# Patient Record
Sex: FEMALE | Race: WHITE | NOT HISPANIC OR LATINO | URBAN - METROPOLITAN AREA
[De-identification: names, ages, dates, MRNs, and addresses within clinical notes are randomized per-mention and may not be internally consistent; named-entity substitution may affect disease eponyms.]

---

## 2019-01-02 ENCOUNTER — APPOINTMENT (OUTPATIENT)
Dept: URBAN - METROPOLITAN AREA CLINIC 219 | Age: 29
Setting detail: DERMATOLOGY
End: 2019-01-02

## 2019-01-02 DIAGNOSIS — L01.01 NON-BULLOUS IMPETIGO: ICD-10-CM

## 2019-01-02 PROBLEM — L08.9 LOCAL INFECTION OF THE SKIN AND SUBCUTANEOUS TISSUE, UNSPECIFIED: Status: ACTIVE | Noted: 2019-01-02

## 2019-01-02 PROCEDURE — OTHER PRESCRIPTION: OTHER

## 2019-01-02 PROCEDURE — OTHER TREATMENT REGIMEN: OTHER

## 2019-01-02 PROCEDURE — 99202 OFFICE O/P NEW SF 15 MIN: CPT

## 2019-01-02 RX ORDER — DOXYCYCLINE 100 MG/1
1 CAPSULE ORAL
Qty: 28 | Refills: 0 | Status: ERX | COMMUNITY
Start: 2019-01-02

## 2019-01-02 RX ORDER — MUPIROCIN 20 MG/G
APPLY OINTMENT TOPICAL AS NEEDED
Qty: 1 | Refills: 3 | Status: ERX | COMMUNITY
Start: 2019-01-02

## 2019-01-02 ASSESSMENT — LOCATION ZONE DERM
LOCATION ZONE: FACE
LOCATION ZONE: ARM

## 2019-01-02 ASSESSMENT — LOCATION DETAILED DESCRIPTION DERM
LOCATION DETAILED: RIGHT VENTRAL LATERAL PROXIMAL FOREARM
LOCATION DETAILED: LEFT CHIN

## 2019-01-02 ASSESSMENT — LOCATION SIMPLE DESCRIPTION DERM
LOCATION SIMPLE: RIGHT FOREARM
LOCATION SIMPLE: CHIN

## 2019-01-02 NOTE — PROCEDURE: TREATMENT REGIMEN
Samples Given: Dove Wash for Sensitive skin ( Bar )
Plan: Bacterial Culture performed on chin and right arm ( sent to [facility] )\\ndoxycycline monohydrate 100 mg: Take 1 pill twice a day with food and water x14 days\\nmupirocin 2 % topical ointment: Apply to wounds twice a day as needed
Detail Level: Simple

## 2019-01-16 ENCOUNTER — APPOINTMENT (OUTPATIENT)
Dept: URBAN - METROPOLITAN AREA CLINIC 219 | Age: 29
Setting detail: DERMATOLOGY
End: 2019-01-16

## 2019-01-16 DIAGNOSIS — L01.01 NON-BULLOUS IMPETIGO: ICD-10-CM

## 2019-01-16 DIAGNOSIS — L90.5 SCAR CONDITIONS AND FIBROSIS OF SKIN: ICD-10-CM

## 2019-01-16 PROBLEM — F32.9 MAJOR DEPRESSIVE DISORDER, SINGLE EPISODE, UNSPECIFIED: Status: ACTIVE | Noted: 2019-01-16

## 2019-01-16 PROBLEM — F41.9 ANXIETY DISORDER, UNSPECIFIED: Status: ACTIVE | Noted: 2019-01-16

## 2019-01-16 PROBLEM — L70.0 ACNE VULGARIS: Status: ACTIVE | Noted: 2019-01-16

## 2019-01-16 PROCEDURE — OTHER MIPS QUALITY: OTHER

## 2019-01-16 PROCEDURE — OTHER TREATMENT REGIMEN: OTHER

## 2019-01-16 PROCEDURE — 99213 OFFICE O/P EST LOW 20 MIN: CPT

## 2019-01-16 ASSESSMENT — LOCATION ZONE DERM
LOCATION ZONE: FACE
LOCATION ZONE: ARM
LOCATION ZONE: LEG

## 2019-01-16 ASSESSMENT — LOCATION DETAILED DESCRIPTION DERM
LOCATION DETAILED: LEFT CHIN
LOCATION DETAILED: RIGHT VENTRAL LATERAL PROXIMAL FOREARM
LOCATION DETAILED: LEFT MEDIAL DISTAL PRETIBIAL REGION

## 2019-01-16 ASSESSMENT — LOCATION SIMPLE DESCRIPTION DERM
LOCATION SIMPLE: LEFT PRETIBIAL REGION
LOCATION SIMPLE: CHIN
LOCATION SIMPLE: RIGHT FOREARM

## 2019-01-16 NOTE — PROCEDURE: TREATMENT REGIMEN
Detail Level: Simple
Otc Regimen: Silicone Gel as needed
Continue Regimen: Complete Bactrim course (patient has two days left)\\nMupirocin Ointment twice a day prn

## 2020-01-10 ENCOUNTER — APPOINTMENT (OUTPATIENT)
Dept: URBAN - METROPOLITAN AREA CLINIC 219 | Age: 30
Setting detail: DERMATOLOGY
End: 2020-01-10

## 2020-01-10 DIAGNOSIS — L08.9 LOCAL INFECTION OF THE SKIN AND SUBCUTANEOUS TISSUE, UNSPECIFIED: ICD-10-CM

## 2020-01-10 PROCEDURE — OTHER PRESCRIPTION: OTHER

## 2020-01-10 PROCEDURE — OTHER MIPS QUALITY: OTHER

## 2020-01-10 PROCEDURE — 99213 OFFICE O/P EST LOW 20 MIN: CPT

## 2020-01-10 PROCEDURE — OTHER TREATMENT REGIMEN: OTHER

## 2020-01-10 RX ORDER — MUPIROCIN 20 MG/G
APPLY OINTMENT TOPICAL AS NEEDED
Qty: 1 | Refills: 5 | Status: ERX

## 2020-01-10 RX ORDER — CHLORHEXIDINE GLUCONATE 213 G/1000ML
APPLY SOLUTION TOPICAL PRN
Qty: 1 | Refills: 2 | Status: ERX | COMMUNITY
Start: 2020-01-10

## 2020-01-10 ASSESSMENT — LOCATION ZONE DERM: LOCATION ZONE: AXILLAE

## 2020-01-10 ASSESSMENT — LOCATION DETAILED DESCRIPTION DERM: LOCATION DETAILED: RIGHT AXILLARY VAULT

## 2020-01-10 ASSESSMENT — LOCATION SIMPLE DESCRIPTION DERM: LOCATION SIMPLE: RIGHT AXILLARY VAULT

## 2020-01-10 NOTE — PROCEDURE: TREATMENT REGIMEN
Detail Level: Simple
Plan: Bactrim DS twice a day x10 days with food \\nMupirocin ointment twice a day ( also apply intranasal first five days of each month ) \\nHibiclens Wash once a day, avoid eyes and ears